# Patient Record
Sex: FEMALE | Race: BLACK OR AFRICAN AMERICAN | NOT HISPANIC OR LATINO | Employment: FULL TIME | URBAN - METROPOLITAN AREA
[De-identification: names, ages, dates, MRNs, and addresses within clinical notes are randomized per-mention and may not be internally consistent; named-entity substitution may affect disease eponyms.]

---

## 2018-02-24 ENCOUNTER — OFFICE VISIT (OUTPATIENT)
Dept: URGENT CARE | Facility: CLINIC | Age: 63
End: 2018-02-24
Payer: OTHER GOVERNMENT

## 2018-02-24 VITALS
RESPIRATION RATE: 18 BRPM | HEART RATE: 66 BPM | TEMPERATURE: 98 F | WEIGHT: 189 LBS | BODY MASS INDEX: 33.49 KG/M2 | OXYGEN SATURATION: 98 % | SYSTOLIC BLOOD PRESSURE: 166 MMHG | HEIGHT: 63 IN | DIASTOLIC BLOOD PRESSURE: 82 MMHG

## 2018-02-24 DIAGNOSIS — R30.0 DYSURIA: Primary | ICD-10-CM

## 2018-02-24 LAB
BILIRUB UR QL STRIP: NEGATIVE
GLUCOSE UR QL STRIP: NEGATIVE
KETONES UR QL STRIP: NEGATIVE
LEUKOCYTE ESTERASE UR QL STRIP: NEGATIVE
PH, POC UA: 5.5 (ref 5–8)
POC BLOOD, URINE: NEGATIVE
POC NITRATES, URINE: NEGATIVE
PROT UR QL STRIP: NEGATIVE
SP GR UR STRIP: 1 (ref 1–1.03)
UROBILINOGEN UR STRIP-ACNC: NORMAL (ref 0.1–1.1)

## 2018-02-24 PROCEDURE — 81003 URINALYSIS AUTO W/O SCOPE: CPT | Mod: QW,S$GLB,, | Performed by: FAMILY MEDICINE

## 2018-02-24 PROCEDURE — 99214 OFFICE O/P EST MOD 30 MIN: CPT | Mod: 25,S$GLB,, | Performed by: FAMILY MEDICINE

## 2018-02-24 PROCEDURE — 3008F BODY MASS INDEX DOCD: CPT | Mod: S$GLB,,, | Performed by: FAMILY MEDICINE

## 2018-02-24 RX ORDER — PHENAZOPYRIDINE HYDROCHLORIDE 100 MG/1
100 TABLET, FILM COATED ORAL 3 TIMES DAILY PRN
Qty: 9 TABLET | Refills: 0 | Status: SHIPPED | OUTPATIENT
Start: 2018-02-24 | End: 2018-02-27

## 2018-02-24 RX ORDER — CIPROFLOXACIN 500 MG/1
500 TABLET ORAL 2 TIMES DAILY
Qty: 14 TABLET | Refills: 0 | Status: SHIPPED | OUTPATIENT
Start: 2018-02-24 | End: 2018-03-03

## 2018-02-24 NOTE — PATIENT INSTRUCTIONS
Treating Interstitial Cystitis: Lifestyle Changes    Interstitial cystitis (IC) is a type of bladder problem that causes the bladder wall to be tender and easily irritated. This can cause pain and other uncomfortable symptoms. Interstitial cystitis can be treated in many different ways. This includes making certain lifestyle changes to help manage symptoms. Following are some common lifestyle changes that may be included as part of your treatment.  Avoiding certain foods  Your healthcare provider may advise you to avoid certain foods that can worsen your symptoms. These include alcohol, spicy food, chocolate, and caffeine. These can also include citrus fruits and juices, tomatoes, and carbonated drinks. Start by cutting certain foods out of your diet for a few weeks. Then, add the foods back into your diet. See whether this has any effect on your symptoms.  Retraining your bladder  Your provider may also recommend bladder retraining. This involves holding urine in for longer and longer periods. The goal is to stretch the bladder and increase the amount the bladder can hold.  Managing stress  In addition, your provider may teach you various methods to help manage the stress in your life. Stress does not cause interstitial cystitis, but it can make your symptoms worse. Meditation, massage, and yoga are some good ways to relax and relieve stress. Exercise can help relieve stress as well. You may want to start with walking or swimming. These activities are less likely to cause symptoms and may be easier for you to do regularly. Another important lifestyle change that your doctor may prescribe is the use of pelvic myofascial exercises.  Date Last Reviewed: 1/1/2017  © 5118-1132 [employer] [address] [place] [address], [place] [address]. All rights reserved. This information is not intended as a substitute for professional medical care. Always follow your healthcare professional's instructions.

## 2018-02-24 NOTE — PROGRESS NOTES
"Subjective:       Patient ID: Vick Tellez is a 62 y.o. female.    Vitals:  height is 5' 3" (1.6 m) and weight is 85.7 kg (189 lb). Her oral temperature is 97.6 °F (36.4 °C). Her blood pressure is 166/82 (abnormal) and her pulse is 66. Her respiration is 18 and oxygen saturation is 98%.     Chief Complaint: Dysuria    This is a 62 y.o. female with No past medical history on file.   who presents today with a chief complaint of dysuria. Hx of interstitial cystitis      Dysuria    This is a new problem. The current episode started in the past 7 days. The problem occurs every urination. The problem has been gradually worsening. The quality of the pain is described as aching and burning. The pain is at a severity of 8/10. The pain is moderate. There has been no fever. She is sexually active. Associated symptoms include hesitancy and urgency. Pertinent negatives include no chills, hematuria, nausea or vomiting. Treatments tried: cranberry , apple  vinegar. The treatment provided no relief.     Review of Systems   Constitution: Negative for chills and fever.   Skin: Negative for itching.   Musculoskeletal: Negative for back pain.   Gastrointestinal: Negative for abdominal pain, nausea and vomiting.   Genitourinary: Positive for dysuria, hesitancy and urgency. Negative for genital sores, hematuria and missed menses.       Objective:      Physical Exam   Constitutional: She appears well-developed and well-nourished.   HENT:   Head: Normocephalic and atraumatic.   Eyes: EOM are normal. Pupils are equal, round, and reactive to light.   Cardiovascular: Normal rate, regular rhythm and normal heart sounds.    Pulmonary/Chest: Effort normal and breath sounds normal.   Abdominal: Soft. There is no tenderness.   Nursing note and vitals reviewed.      Results for orders placed or performed in visit on 02/24/18   POCT Urinalysis, Dipstick, Automated, W/O Scope   Result Value Ref Range    POC Blood, Urine Negative Negative    POC " Bilirubin, Urine Negative Negative    POC Urobilinogen, Urine normal 0.1 - 1.1    POC Ketones, Urine Negative Negative    POC Protein, Urine Negative Negative    POC Nitrates, Urine Negative Negative    POC Glucose, Urine Negative Negative    pH, UA 5.5 5 - 8    POC Specific Gravity, Urine 1.005 1.003 - 1.029    POC Leukocytes, Urine Negative Negative     Assessment:       1. Dysuria        Plan:         Dysuria  -     POCT Urinalysis, Dipstick, Automated, W/O Scope  -     ciprofloxacin HCl (CIPRO) 500 MG tablet; Take 1 tablet (500 mg total) by mouth 2 (two) times daily.  Dispense: 14 tablet; Refill: 0  -     phenazopyridine (PYRIDIUM) 100 MG tablet; Take 1 tablet (100 mg total) by mouth 3 (three) times daily as needed.  Dispense: 9 tablet; Refill: 0  -     Urine culture    given prescription for antibiotics to hold pending urine culture. Patient to call.

## 2018-02-27 ENCOUNTER — TELEPHONE (OUTPATIENT)
Dept: URGENT CARE | Facility: CLINIC | Age: 63
End: 2018-02-27

## 2018-02-27 LAB
BACTERIA UR CULT: NORMAL
BACTERIA UR CULT: NORMAL

## 2018-02-27 NOTE — TELEPHONE ENCOUNTER
Left a message for call back. SARAH  ----- Message from Velma Maier PA-C sent at 2/27/2018 12:15 PM CST -----  Please call the patient regarding her negative culture. Inquire about her symptoms. She was treated below.